# Patient Record
Sex: MALE | Race: WHITE
[De-identification: names, ages, dates, MRNs, and addresses within clinical notes are randomized per-mention and may not be internally consistent; named-entity substitution may affect disease eponyms.]

---

## 2009-09-04 VITALS — SYSTOLIC BLOOD PRESSURE: 8 MMHG

## 2019-08-30 ENCOUNTER — HOSPITAL ENCOUNTER (INPATIENT)
Dept: HOSPITAL 19 - COL.ER | Age: 84
LOS: 1 days | Discharge: HOME | DRG: 65 | End: 2019-08-31
Attending: INTERNAL MEDICINE | Admitting: INTERNAL MEDICINE
Payer: MEDICARE

## 2019-08-30 VITALS — HEART RATE: 65 BPM | TEMPERATURE: 98.1 F | SYSTOLIC BLOOD PRESSURE: 133 MMHG | DIASTOLIC BLOOD PRESSURE: 82 MMHG

## 2019-08-30 VITALS — HEART RATE: 65 BPM | DIASTOLIC BLOOD PRESSURE: 80 MMHG | SYSTOLIC BLOOD PRESSURE: 145 MMHG

## 2019-08-30 VITALS — SYSTOLIC BLOOD PRESSURE: 141 MMHG | DIASTOLIC BLOOD PRESSURE: 86 MMHG | HEART RATE: 63 BPM | TEMPERATURE: 97.8 F

## 2019-08-30 VITALS — WEIGHT: 191.8 LBS | HEIGHT: 67.99 IN | BODY MASS INDEX: 29.07 KG/M2

## 2019-08-30 DIAGNOSIS — I63.9: Primary | ICD-10-CM

## 2019-08-30 DIAGNOSIS — J98.4: ICD-10-CM

## 2019-08-30 DIAGNOSIS — E78.00: ICD-10-CM

## 2019-08-30 DIAGNOSIS — I10: ICD-10-CM

## 2019-08-30 DIAGNOSIS — I65.1: ICD-10-CM

## 2019-08-30 DIAGNOSIS — D50.9: ICD-10-CM

## 2019-08-30 DIAGNOSIS — Z92.3: ICD-10-CM

## 2019-08-30 DIAGNOSIS — M85.80: ICD-10-CM

## 2019-08-30 DIAGNOSIS — E03.9: ICD-10-CM

## 2019-08-30 DIAGNOSIS — Z85.46: ICD-10-CM

## 2019-08-30 DIAGNOSIS — Z88.8: ICD-10-CM

## 2019-08-30 DIAGNOSIS — Z88.0: ICD-10-CM

## 2019-08-30 DIAGNOSIS — I65.22: ICD-10-CM

## 2019-08-30 DIAGNOSIS — E87.1: ICD-10-CM

## 2019-08-30 DIAGNOSIS — Z87.891: ICD-10-CM

## 2019-08-30 LAB
ALBUMIN SERPL-MCNC: 2.8 GM/DL (ref 3.5–5)
ALP SERPL-CCNC: 43 U/L (ref 50–136)
ALT SERPL-CCNC: 20 U/L (ref 21–72)
ANION GAP SERPL CALC-SCNC: 6 MMOL/L (ref 7–16)
APTT PPP: 33.6 SECONDS (ref 26–37)
AST SERPL-CCNC: 48 U/L (ref 15–37)
BASOPHILS # BLD: 0 10*3/UL (ref 0–0.2)
BASOPHILS NFR BLD AUTO: 0.2 % (ref 0–2)
BILIRUB SERPL-MCNC: 0.8 MG/DL (ref 0–1)
BUN SERPL-MCNC: 11 MG/DL (ref 9–20)
CALCIUM SERPL-MCNC: 7.2 MG/DL (ref 8.4–10.2)
CHLORIDE SERPL-SCNC: 105 MMOL/L (ref 98–107)
CO2 SERPL-SCNC: 24 MMOL/L (ref 22–30)
CREAT SERPL-SCNC: 0.55 UMOL/L (ref 0.66–1.25)
EOSINOPHIL # BLD: 0.1 10*3/UL (ref 0–0.7)
EOSINOPHIL NFR BLD: 1.4 % (ref 0–4)
ERYTHROCYTE [DISTWIDTH] IN BLOOD BY AUTOMATED COUNT: 12.4 % (ref 11.5–14.5)
GLUCOSE SERPL-MCNC: 77 MG/DL (ref 74–106)
GRANULOCYTES # BLD AUTO: 64.5 % (ref 42.2–75.2)
HCT VFR BLD AUTO: 30.6 % (ref 42–52)
HGB BLD-MCNC: 10.4 G/DL (ref 13.5–18)
INR BLD: 1.1 (ref 0.8–3)
LYMPHOCYTES # BLD: 1.1 10*3/UL (ref 1.2–3.4)
LYMPHOCYTES NFR BLD: 22 % (ref 20–51)
MCH RBC QN AUTO: 35 PG (ref 27–31)
MCHC RBC AUTO-ENTMCNC: 34 G/DL (ref 33–37)
MCV RBC AUTO: 103 FL (ref 80–100)
MONOCYTES # BLD: 0.6 10*3/UL (ref 0.1–0.6)
MONOCYTES NFR BLD AUTO: 11.5 % (ref 1.7–9.3)
NEUTROPHILS # BLD: 3.1 10*3/UL (ref 1.4–6.5)
PLATELET # BLD AUTO: 169 K/MM3 (ref 130–400)
PMV BLD AUTO: 9.4 FL (ref 7.4–10.4)
POTASSIUM SERPL-SCNC: 3.4 MMOL/L (ref 3.4–5)
PROT SERPL-MCNC: 5.4 GM/DL (ref 6.4–8.2)
PROTHROMBIN TIME: 13 SECONDS (ref 9.7–12.8)
RBC # BLD AUTO: 2.97 M/MM3 (ref 4.2–5.6)
SODIUM SERPL-SCNC: 135 MMOL/L (ref 137–145)
TROPONIN I SERPL-MCNC: < 0.012 NG/ML (ref 0–0.04)

## 2019-08-30 PROCEDURE — A9585 GADOBUTROL INJECTION: HCPCS

## 2019-08-30 NOTE — NUR
PT A/O X4, AMBULATING IN ROOM. PT HAD BUMPED IV ON LEFT HAND AND LEFT HAND WAS
BLEEDING. APPLIED PRESSURE TO SITE THEN HAD TAKEN IV OUT APPLIED PRESSURE TILL
BLEEDING STOPPED AND A PROTECTIVE DRSG OVER SITE. PLACED A NEW 20G IV IN LEFT
AC, X2 ATTEMPTS, WITH NO DIFFICULTIES, AND PT TOLERATED WELL. PT DID NOT
APPEAR TO HAVE ANY RESIDUALS WHEN AMBULATING, BUT WHEN PT TRIES TO HOLD RIGHT
ARM UP HE HAS TO USE THE LEFT HAND TO BRING RIGHT ARM UP. PT'S  ARE EQUAL
BUT STRENGTH IN RIGHT ARM IS MUCH WEAKER WHEN APPLYING PRESSURE UP OR DOWN
WHEN ARMS ARE HELD UP. PT DENIES PAIN OR DISCOMFORT AND IS AWARE THAT HE WILL
BE AWAKEN FOR NEURO CHECKS AND STROKE SCALE. PT'S FAMILY WAS IN ROOM WITH PT
VISITING BUT HAS SINCE LEFT AND NOW PT IN BED WITH HOB AT 30 DEGREE ANGLE AND
REST/SLEEPING. CALL LIGHT WITHIN REACH.

## 2019-08-30 NOTE — NUR
Resting on cart. Very talkative. C/o right arm and right leg " just not moving
the way they normally do". Lungs CTA. Bowel sounds x4. 20 gauge INT had been
placed to left hand. Pupils 2 cm and fixed. +1 edema noted to right leg.
Follows directions and answers questions apporpriately

## 2019-08-30 NOTE — NUR
Pt arrives to medical unit rm 306 from ED via WC accompanied by CNA's and pt's
family. Pt awake and alert, oriented x 4. Slight drift to right upper ext,
hand  equal bilat. Strength to lower ext equal bilat with slight limp on
right side when ambulating, otherwise steady gait. Pt denies pain at this
time. Saline lock IV without s/s of complications. No further needs reported.
Call light in reach.

## 2019-08-30 NOTE — NUR
CALLED DR. PORTER IN REFERENCE TO LIPITOR AND NEURO CHECKS. DR. PORTER ADVISED
THAT LIPITOR IS GIVEN TO EVERYONE WHO HAS HAD A STROKE AND CAN CHANGE NEURO
CHECKS FROM EVERY 2 HOURS TO EVERY 4 HOURS.

## 2019-08-31 VITALS — DIASTOLIC BLOOD PRESSURE: 82 MMHG | SYSTOLIC BLOOD PRESSURE: 132 MMHG | TEMPERATURE: 97.9 F | HEART RATE: 68 BPM

## 2019-08-31 VITALS — HEART RATE: 71 BPM | DIASTOLIC BLOOD PRESSURE: 92 MMHG | SYSTOLIC BLOOD PRESSURE: 137 MMHG | TEMPERATURE: 97.7 F

## 2019-08-31 VITALS — HEART RATE: 60 BPM | SYSTOLIC BLOOD PRESSURE: 136 MMHG | DIASTOLIC BLOOD PRESSURE: 78 MMHG | TEMPERATURE: 97.9 F

## 2019-08-31 LAB
ALBUMIN SERPL-MCNC: 3.7 GM/DL (ref 3.5–5)
ALP SERPL-CCNC: 57 U/L (ref 50–136)
ALT SERPL-CCNC: 27 U/L (ref 21–72)
ANION GAP SERPL CALC-SCNC: 8 MMOL/L (ref 7–16)
AST SERPL-CCNC: 38 U/L (ref 15–37)
BASOPHILS # BLD: 0 10*3/UL (ref 0–0.2)
BASOPHILS NFR BLD AUTO: 0.3 % (ref 0–2)
BILIRUB SERPL-MCNC: 1 MG/DL (ref 0–1)
BUN SERPL-MCNC: 13 MG/DL (ref 9–20)
CALCIUM SERPL-MCNC: 9.2 MG/DL (ref 8.4–10.2)
CHLORIDE SERPL-SCNC: 99 MMOL/L (ref 98–107)
CHOLEST SPEC-SCNC: 187 MG/DL (ref 120–200)
CHOLEST/HDLC SERPL-SRTO: 5.8
CO2 SERPL-SCNC: 25 MMOL/L (ref 22–30)
CREAT SERPL-SCNC: 0.64 UMOL/L (ref 0.66–1.25)
EOSINOPHIL # BLD: 0.1 10*3/UL (ref 0–0.7)
EOSINOPHIL NFR BLD: 2.8 % (ref 0–4)
ERYTHROCYTE [DISTWIDTH] IN BLOOD BY AUTOMATED COUNT: 12.5 % (ref 11.5–14.5)
FOLATE (FOLIC ACID): 17.2 NG/ML (ref 7–31.4)
GLUCOSE SERPL-MCNC: 83 MG/DL (ref 74–106)
GRANULOCYTES # BLD AUTO: 50.3 % (ref 42.2–75.2)
HCT VFR BLD AUTO: 34.2 % (ref 42–52)
HDLC SERPL-MCNC: 32 MG/DL
HGB BLD-MCNC: 11.7 G/DL (ref 13.5–18)
LDLC SERPL-MCNC: 137 MG/DL
LYMPHOCYTES # BLD: 1.1 10*3/UL (ref 1.2–3.4)
LYMPHOCYTES NFR BLD: 31 % (ref 20–51)
MCH RBC QN AUTO: 35 PG (ref 27–31)
MCHC RBC AUTO-ENTMCNC: 34 G/DL (ref 33–37)
MCV RBC AUTO: 103 FL (ref 80–100)
MONOCYTES # BLD: 0.6 10*3/UL (ref 0.1–0.6)
MONOCYTES NFR BLD AUTO: 15.6 % (ref 1.7–9.3)
NEUTROPHILS # BLD: 1.8 10*3/UL (ref 1.4–6.5)
PLATELET # BLD AUTO: 210 K/MM3 (ref 130–400)
PMV BLD AUTO: 9.7 FL (ref 7.4–10.4)
POTASSIUM SERPL-SCNC: 3.7 MMOL/L (ref 3.4–5)
PROT SERPL-MCNC: 6.7 GM/DL (ref 6.4–8.2)
RBC # BLD AUTO: 3.33 M/MM3 (ref 4.2–5.6)
SODIUM SERPL-SCNC: 133 MMOL/L (ref 137–145)
TRIGL SERPL-MCNC: 89 MG/DL

## 2019-08-31 NOTE — NUR
PT SLEEPING/RESTING WITH HOB AT 15 DEGREE ANGLE. PT AWAKENS EASILY FOR NEURO
AND STROKE SCALE ASSESSMENTS. PT'S 0400 NEURO'S AND STROKE SCALE ASSESSMENT,
SEEMED TO HAVE SOME IMPROVEMENT WITH HIS RIGHT ARM. PT WAS ABLE TO LIFT IT
WITHOUT THE LEFT HAND HELPING, AND DRIFT WAS MINIMAL. STRENGHT WAS STILL WEAK
WHEN APPLYING RESISTANCE. PT A/O X4, DENIES PAIN OR DISCOMFORT AND HAS NO
NEEDS AT THIS TIME.  CALL LIGHT WITHIN REACH.

## 2019-08-31 NOTE — NUR
Pt discharge instructions discussed and reviewed with patient and family. All
questions answered and verbalizes understanding. LAC INT IV dc'd with catheter
tip intact and no complications. Pt escorted out via WC by this nurse.

## 2019-08-31 NOTE — NUR
Pt assessment completed and charted. Pt appears to be doing well. Per pt he
wasn't able to lift right arm without assistance prior. pt ambulating
independently in halls with no issues, steady gait. Neuro checks are good and
pt not scoring on stroke scale. Pt denies dizziness, SOB, N/V, chest pain. LAC
INT IV flushes with no complications. Pt denies other needs at this time. Call
light within reach.

## 2019-10-15 ENCOUNTER — HOSPITAL ENCOUNTER (OUTPATIENT)
Dept: HOSPITAL 19 - WSPT | Age: 84
LOS: 62 days | Discharge: HOME | End: 2019-12-16
Attending: FAMILY MEDICINE
Payer: MEDICARE

## 2019-10-15 DIAGNOSIS — I63.9: Primary | ICD-10-CM

## 2020-01-15 ENCOUNTER — HOSPITAL ENCOUNTER (OUTPATIENT)
Dept: HOSPITAL 19 - EUO | Age: 85
Discharge: HOME | End: 2020-01-15
Attending: FAMILY MEDICINE
Payer: MEDICARE

## 2020-01-15 VITALS — HEART RATE: 71 BPM | TEMPERATURE: 97.2 F | DIASTOLIC BLOOD PRESSURE: 83 MMHG | SYSTOLIC BLOOD PRESSURE: 130 MMHG

## 2020-01-15 VITALS — WEIGHT: 187.39 LBS | HEIGHT: 67.99 IN | BODY MASS INDEX: 28.4 KG/M2

## 2020-01-15 DIAGNOSIS — M81.6: Primary | ICD-10-CM

## 2021-07-22 ENCOUNTER — HOSPITAL ENCOUNTER (OUTPATIENT)
Dept: HOSPITAL 19 - SDCO | Age: 86
End: 2021-07-22
Attending: SURGERY
Payer: MEDICARE

## 2021-07-22 VITALS — HEART RATE: 70 BPM | SYSTOLIC BLOOD PRESSURE: 122 MMHG | DIASTOLIC BLOOD PRESSURE: 78 MMHG

## 2021-07-22 VITALS — BODY MASS INDEX: 27.13 KG/M2 | WEIGHT: 179.02 LBS | HEIGHT: 67.99 IN

## 2021-07-22 VITALS — DIASTOLIC BLOOD PRESSURE: 84 MMHG | HEART RATE: 66 BPM | SYSTOLIC BLOOD PRESSURE: 138 MMHG

## 2021-07-22 VITALS — DIASTOLIC BLOOD PRESSURE: 63 MMHG | TEMPERATURE: 98.2 F | SYSTOLIC BLOOD PRESSURE: 80 MMHG | HEART RATE: 86 BPM

## 2021-07-22 VITALS — TEMPERATURE: 97 F | DIASTOLIC BLOOD PRESSURE: 78 MMHG | HEART RATE: 68 BPM | SYSTOLIC BLOOD PRESSURE: 113 MMHG

## 2021-07-22 DIAGNOSIS — Z79.890: ICD-10-CM

## 2021-07-22 DIAGNOSIS — Z85.46: ICD-10-CM

## 2021-07-22 DIAGNOSIS — K57.30: ICD-10-CM

## 2021-07-22 DIAGNOSIS — M81.0: ICD-10-CM

## 2021-07-22 DIAGNOSIS — D12.4: Primary | ICD-10-CM

## 2021-07-22 DIAGNOSIS — Z86.73: ICD-10-CM

## 2021-07-22 DIAGNOSIS — M19.90: ICD-10-CM

## 2021-07-22 DIAGNOSIS — I10: ICD-10-CM

## 2021-07-22 DIAGNOSIS — D64.9: ICD-10-CM

## 2021-07-22 DIAGNOSIS — E03.9: ICD-10-CM

## 2021-07-22 DIAGNOSIS — Z79.899: ICD-10-CM

## 2021-07-22 DIAGNOSIS — Z79.02: ICD-10-CM

## 2021-07-22 DIAGNOSIS — E78.5: ICD-10-CM

## 2021-07-22 DIAGNOSIS — E78.00: ICD-10-CM

## 2021-07-22 DIAGNOSIS — R53.82: ICD-10-CM

## 2021-07-22 DIAGNOSIS — Z80.9: ICD-10-CM

## 2021-07-22 LAB
HCT VFR BLD AUTO: 31.8 % (ref 42–52)
HGB BLD-MCNC: 10.6 G/DL (ref 13.5–18)

## 2021-07-22 NOTE — NUR
1010 - Patient waking up at this time.
1015 - Patient given muffin and soda at this time.
1025 - Patient tolerating muffin and soda well and without any nausea. Vitals
stable.
1030 - Dismissal instructions gone over with patient. Paitent and family
voice understanding and all questions answered.
1045 - Patient discharged to private vehicle his daughter is driving at
patient enterance via wheelchair without any complications. Patient leaves
thanking staff for services.

## 2021-07-22 NOTE — NUR
Patient arrives back to [facility] at this time. Patient remains on cart due to
drowsiness. Patient monitor applied, vitals stable. Patient's daughter and
spouse at bedside. Dr Chew is visiting with patient's family to go over
procedure results at this time.

## 2021-07-29 ENCOUNTER — HOSPITAL ENCOUNTER (INPATIENT)
Dept: HOSPITAL 19 - COL.ER | Age: 86
LOS: 2 days | Discharge: ANOTHER HOSPITAL | DRG: 682 | End: 2021-07-31
Attending: INTERNAL MEDICINE | Admitting: INTERNAL MEDICINE
Payer: MEDICARE

## 2021-07-29 VITALS — BODY MASS INDEX: 26.06 KG/M2 | WEIGHT: 171.96 LBS | HEIGHT: 67.99 IN

## 2021-07-29 DIAGNOSIS — D64.9: ICD-10-CM

## 2021-07-29 DIAGNOSIS — I48.92: ICD-10-CM

## 2021-07-29 DIAGNOSIS — C90.00: ICD-10-CM

## 2021-07-29 DIAGNOSIS — Z85.46: ICD-10-CM

## 2021-07-29 DIAGNOSIS — Z86.73: ICD-10-CM

## 2021-07-29 DIAGNOSIS — N17.9: Primary | ICD-10-CM

## 2021-07-29 DIAGNOSIS — E03.9: ICD-10-CM

## 2021-07-29 DIAGNOSIS — E78.5: ICD-10-CM

## 2021-07-29 DIAGNOSIS — E87.6: ICD-10-CM

## 2021-07-29 DIAGNOSIS — Z20.822: ICD-10-CM

## 2021-07-29 DIAGNOSIS — Z66: ICD-10-CM

## 2021-07-29 DIAGNOSIS — G93.41: ICD-10-CM

## 2021-07-29 DIAGNOSIS — E83.52: ICD-10-CM

## 2021-07-29 LAB
ALBUMIN SERPL-MCNC: 3.5 GM/DL (ref 3.5–5)
ALP SERPL-CCNC: 84 U/L (ref 50–136)
ALT SERPL-CCNC: 48 U/L (ref 4–49)
ANION GAP SERPL CALC-SCNC: 4 MMOL/L (ref 7–16)
APTT PPP: 29.9 SECONDS (ref 26–37)
AST SERPL-CCNC: 89 U/L (ref 15–37)
BASOPHILS # BLD: 0 10*3/UL (ref 0–0.2)
BASOPHILS NFR BLD AUTO: 0.3 % (ref 0–2)
BILIRUB SERPL-MCNC: 1 MG/DL (ref 0–1)
BUN SERPL-MCNC: 31 MG/DL (ref 9–20)
CALCIUM SERPL-MCNC: 16.2 MG/DL (ref 8.4–10.2)
CHLORIDE SERPL-SCNC: 101 MMOL/L (ref 98–107)
CO2 SERPL-SCNC: 27 MMOL/L (ref 22–30)
CREAT SERPL-SCNC: 2.17 UMOL/L (ref 0.66–1.25)
EOSINOPHIL # BLD: 0.1 10*3/UL (ref 0–0.7)
EOSINOPHIL NFR BLD: 1.4 % (ref 0–4)
ERYTHROCYTE [DISTWIDTH] IN BLOOD BY AUTOMATED COUNT: 12.8 % (ref 11.5–14.5)
GLUCOSE SERPL-MCNC: 102 MG/DL (ref 74–106)
GRANULOCYTES # BLD AUTO: 59.1 % (ref 42.2–75.2)
HCT VFR BLD AUTO: 32.6 % (ref 42–52)
HGB BLD-MCNC: 11.3 G/DL (ref 13.5–18)
INR BLD: 1.1 (ref 0.8–3)
LYMPHOCYTES # BLD: 2.1 10*3/UL (ref 1.2–3.4)
LYMPHOCYTES NFR BLD: 25.9 % (ref 20–51)
MCH RBC QN AUTO: 35 PG (ref 27–31)
MCHC RBC AUTO-ENTMCNC: 35 G/DL (ref 33–37)
MCV RBC AUTO: 100 FL (ref 80–100)
MONOCYTES # BLD: 1 10*3/UL (ref 0.1–0.6)
MONOCYTES NFR BLD AUTO: 12.9 % (ref 1.7–9.3)
NEUTROPHILS # BLD: 4.7 10*3/UL (ref 1.4–6.5)
PH UR STRIP.AUTO: 6 [PH] (ref 5–8)
PLATELET # BLD AUTO: 226 K/MM3 (ref 130–400)
PMV BLD AUTO: 10 FL (ref 7.4–10.4)
POTASSIUM SERPL-SCNC: 3.5 MMOL/L (ref 3.4–5)
PROT SERPL-MCNC: 6.7 GM/DL (ref 6.4–8.2)
PROTHROMBIN TIME: 12.2 SECONDS (ref 9.7–12.8)
RBC # BLD AUTO: 3.26 M/MM3 (ref 4.2–5.6)
RBC # UR: (no result) /HPF
SODIUM SERPL-SCNC: 133 MMOL/L (ref 137–145)
SP GR UR STRIP.AUTO: 1.01 (ref 1–1.03)
SQUAMOUS # URNS: (no result) /HPF
TROPONIN I SERPL-MCNC: < 0.012 NG/ML (ref 0–0.04)
URN COLLECT METHOD CLASS: (no result)
WBC # UR: (no result) /HPF

## 2021-07-30 VITALS — HEART RATE: 83 BPM | SYSTOLIC BLOOD PRESSURE: 151 MMHG | DIASTOLIC BLOOD PRESSURE: 86 MMHG | TEMPERATURE: 97.7 F

## 2021-07-30 VITALS — TEMPERATURE: 97.7 F | SYSTOLIC BLOOD PRESSURE: 143 MMHG | DIASTOLIC BLOOD PRESSURE: 98 MMHG | HEART RATE: 103 BPM

## 2021-07-30 VITALS — SYSTOLIC BLOOD PRESSURE: 149 MMHG | DIASTOLIC BLOOD PRESSURE: 93 MMHG | TEMPERATURE: 97.6 F | HEART RATE: 95 BPM

## 2021-07-30 VITALS — SYSTOLIC BLOOD PRESSURE: 138 MMHG | TEMPERATURE: 97.8 F | DIASTOLIC BLOOD PRESSURE: 88 MMHG | HEART RATE: 86 BPM

## 2021-07-30 VITALS — HEART RATE: 84 BPM | TEMPERATURE: 98.1 F | DIASTOLIC BLOOD PRESSURE: 84 MMHG | SYSTOLIC BLOOD PRESSURE: 134 MMHG

## 2021-07-30 LAB
ALBUMIN SERPL-MCNC: 3.6 GM/DL (ref 3.5–5)
ALP SERPL-CCNC: 77 U/L (ref 50–136)
ALT SERPL-CCNC: 59 U/L (ref 4–49)
ANION GAP SERPL CALC-SCNC: 6 MMOL/L (ref 7–16)
AST SERPL-CCNC: 120 U/L (ref 15–37)
BASOPHILS # BLD: 0 10*3/UL (ref 0–0.2)
BASOPHILS NFR BLD AUTO: 0.2 % (ref 0–2)
BILIRUB SERPL-MCNC: 0.8 MG/DL (ref 0–1)
BUN SERPL-MCNC: 36 MG/DL (ref 9–20)
CALCIUM SERPL-MCNC: 14.4 MG/DL (ref 8.4–10.2)
CHLORIDE SERPL-SCNC: 102 MMOL/L (ref 98–107)
CO2 SERPL-SCNC: 28 MMOL/L (ref 22–30)
CREAT SERPL-SCNC: 2.17 UMOL/L (ref 0.66–1.25)
EOSINOPHIL # BLD: 0 10*3/UL (ref 0–0.7)
EOSINOPHIL NFR BLD: 0 % (ref 0–4)
ERYTHROCYTE [DISTWIDTH] IN BLOOD BY AUTOMATED COUNT: 12.8 % (ref 11.5–14.5)
GLUCOSE SERPL-MCNC: 125 MG/DL (ref 74–106)
GRANULOCYTES # BLD AUTO: 78.7 % (ref 42.2–75.2)
HCT VFR BLD AUTO: 32.6 % (ref 42–52)
HGB BLD-MCNC: 10.9 G/DL (ref 13.5–18)
LDH SERPL-CCNC: 554 U/L (ref 313–618)
LYMPHOCYTES # BLD: 1.9 10*3/UL (ref 1.2–3.4)
LYMPHOCYTES NFR BLD: 14.2 % (ref 20–51)
MCH RBC QN AUTO: 34 PG (ref 27–31)
MCHC RBC AUTO-ENTMCNC: 33 G/DL (ref 33–37)
MCV RBC AUTO: 102 FL (ref 80–100)
MONOCYTES # BLD: 0.8 10*3/UL (ref 0.1–0.6)
MONOCYTES NFR BLD AUTO: 6.4 % (ref 1.7–9.3)
NEUTROPHILS # BLD: 10.3 10*3/UL (ref 1.4–6.5)
PLATELET # BLD AUTO: 239 K/MM3 (ref 130–400)
PMV BLD AUTO: 10.1 FL (ref 7.4–10.4)
POTASSIUM SERPL-SCNC: 3 MMOL/L (ref 3.4–5)
PROT SERPL-MCNC: 6.6 GM/DL (ref 6.4–8.2)
RBC # BLD AUTO: 3.2 M/MM3 (ref 4.2–5.6)
SODIUM SERPL-SCNC: 136 MMOL/L (ref 137–145)
URATE SERPL-MCNC: 8.6 MG/DL (ref 3.5–8.5)

## 2021-07-30 NOTE — NUR
SLEEPING UPON ASSESSMENT. WAKES AT TIMES AND ATTEMPTS TO CLIMB OUT OF BED. NOT
ALERT ENOUGH FOR COLACE ORDERED AND HELD. NS GOING AT 100ML. K PROTOCOL
ORDERED BY DR ROTHMAN WILL PLACE ORDER. CASEY CUMMINGS TO [place].

## 2021-07-30 NOTE — NUR
ALEENA met with the patient's wife, Lydia (c.ph#[phone],
h.ph#[phone]), to discuss discharge plan. The patient lives in [place]
with his wife. Lydia reports that the patient was very active and
independent with ADLs up until a few days ago. She reports that recently he
has been needing assistance with his ADLs and has a cane and walker. The
patient's PCP is Dr. Pantera Gtz and he receives his medications from [facility]. The patient does not have a DPOA-HC, but his wife was interested in
obtaining a form. ALEENA provided. PT/OT is recommending SNF. The patient had
increased falls and confusion at home before coming home. ALEENA discussed SNF
with Lydia. Lydia reports that she would like to be able to take the
patient home, if able, and would be open to home health. ALEENA informed her how
the patient is a fall risk. Lydia would like to see how the patient does. SW
to continue to follow.
 
*Discharge plan: Wife would like to see how the patient does, but would like
for him to return home with her*

## 2021-07-30 NOTE — NUR
ARRIVED TO UNIT ATTEMPTED TO CLIMB OUT OF BED. REORIENTATED, BED ALARM ON. PT
THEN WAS FOUND TO HAVE PULLED OUT HIS PEÑA CATH. REPLACED W C/O IT HURTING.
ASSESSMENT DONE. CALL LIGHT WI REACH. EYES VIEW FROM NURSE STATION AS PT HAS
HAD MULTIPLE FALLS.

## 2021-07-30 NOTE — NUR
TV TURNED ON FOR DISTACTION. IV FLUIDS RUNNING AND COBAINED AS PT IS ALL OVER
IN THE BED. STAT LOCK REPLACED TO PEÑA.

## 2021-07-31 VITALS — TEMPERATURE: 97.9 F | DIASTOLIC BLOOD PRESSURE: 59 MMHG | HEART RATE: 76 BPM | SYSTOLIC BLOOD PRESSURE: 116 MMHG

## 2021-07-31 VITALS — TEMPERATURE: 97.6 F | HEART RATE: 91 BPM | SYSTOLIC BLOOD PRESSURE: 137 MMHG | DIASTOLIC BLOOD PRESSURE: 89 MMHG

## 2021-07-31 VITALS — OXYGEN SATURATION: 96 %

## 2021-07-31 VITALS — HEART RATE: 107 BPM | OXYGEN SATURATION: 97 % | SYSTOLIC BLOOD PRESSURE: 82 MMHG | DIASTOLIC BLOOD PRESSURE: 63 MMHG

## 2021-07-31 VITALS — OXYGEN SATURATION: 99 % | HEART RATE: 100 BPM | SYSTOLIC BLOOD PRESSURE: 100 MMHG | DIASTOLIC BLOOD PRESSURE: 71 MMHG

## 2021-07-31 VITALS — OXYGEN SATURATION: 100 %

## 2021-07-31 VITALS — OXYGEN SATURATION: 97 %

## 2021-07-31 VITALS — TEMPERATURE: 97.9 F | DIASTOLIC BLOOD PRESSURE: 81 MMHG | HEART RATE: 83 BPM | SYSTOLIC BLOOD PRESSURE: 130 MMHG

## 2021-07-31 VITALS — OXYGEN SATURATION: 99 %

## 2021-07-31 VITALS — DIASTOLIC BLOOD PRESSURE: 88 MMHG | HEART RATE: 85 BPM | SYSTOLIC BLOOD PRESSURE: 150 MMHG

## 2021-07-31 VITALS — OXYGEN SATURATION: 98 %

## 2021-07-31 VITALS — SYSTOLIC BLOOD PRESSURE: 82 MMHG | OXYGEN SATURATION: 97 % | DIASTOLIC BLOOD PRESSURE: 63 MMHG

## 2021-07-31 VITALS — DIASTOLIC BLOOD PRESSURE: 98 MMHG | SYSTOLIC BLOOD PRESSURE: 147 MMHG | TEMPERATURE: 97.8 F | HEART RATE: 84 BPM

## 2021-07-31 VITALS — OXYGEN SATURATION: 92 %

## 2021-07-31 VITALS — OXYGEN SATURATION: 95 %

## 2021-07-31 LAB
ALBUMIN SERPL-MCNC: 3.5 GM/DL (ref 3.5–5)
ALBUMIN SERPL-MCNC: 3.7 GM/DL (ref 3.5–5)
ALP SERPL-CCNC: 86 U/L (ref 50–136)
ALT SERPL-CCNC: 90 U/L (ref 4–49)
ANION GAP SERPL CALC-SCNC: 4 MMOL/L (ref 7–16)
ANION GAP SERPL CALC-SCNC: 5 MMOL/L (ref 7–16)
AST SERPL-CCNC: 186 U/L (ref 15–37)
BASE EXCESS BLDA CALC-SCNC: -0.5 MMOL/L (ref -2–2)
BASOPHILS # BLD: 0 10*3/UL (ref 0–0.2)
BASOPHILS # BLD: 0 10*3/UL (ref 0–0.2)
BASOPHILS NFR BLD AUTO: 0.1 % (ref 0–2)
BASOPHILS NFR BLD AUTO: 0.1 % (ref 0–2)
BILIRUB SERPL-MCNC: 1.1 MG/DL (ref 0–1)
BUN SERPL-MCNC: 41 MG/DL (ref 9–20)
BUN SERPL-MCNC: 41 MG/DL (ref 9–20)
CALCIUM SERPL-MCNC: 13 MG/DL (ref 8.4–10.2)
CALCIUM SERPL-MCNC: 13 MG/DL (ref 8.4–10.2)
CHLORIDE SERPL-SCNC: 105 MMOL/L (ref 98–107)
CHLORIDE SERPL-SCNC: 107 MMOL/L (ref 98–107)
CO2 BLDA-SCNC: 23.6 MMOL/L
CO2 SERPL-SCNC: 27 MMOL/L (ref 22–30)
CO2 SERPL-SCNC: 29 MMOL/L (ref 22–30)
CREAT SERPL-SCNC: 1.83 UMOL/L (ref 0.66–1.25)
CREAT SERPL-SCNC: 2.02 UMOL/L (ref 0.66–1.25)
EOSINOPHIL # BLD: 0 10*3/UL (ref 0–0.7)
EOSINOPHIL # BLD: 0.1 10*3/UL (ref 0–0.7)
EOSINOPHIL NFR BLD: 0.1 % (ref 0–4)
EOSINOPHIL NFR BLD: 0.4 % (ref 0–4)
ERYTHROCYTE [DISTWIDTH] IN BLOOD BY AUTOMATED COUNT: 12.9 % (ref 11.5–14.5)
ERYTHROCYTE [DISTWIDTH] IN BLOOD BY AUTOMATED COUNT: 12.9 % (ref 11.5–14.5)
GLUCOSE SERPL-MCNC: 105 MG/DL (ref 74–106)
GLUCOSE SERPL-MCNC: 88 MG/DL (ref 74–106)
GRANULOCYTES # BLD AUTO: 65.5 % (ref 42.2–75.2)
GRANULOCYTES # BLD AUTO: 75.2 % (ref 42.2–75.2)
HCO3 BLDA-SCNC: 22.6 MEQ/L (ref 22–26)
HCT VFR BLD AUTO: 34.7 % (ref 42–52)
HCT VFR BLD AUTO: 36.1 % (ref 42–52)
HGB BLD-MCNC: 11.7 G/DL (ref 13.5–18)
HGB BLD-MCNC: 11.8 G/DL (ref 13.5–18)
INHALED O2 CONCENTRATION: 100 %
LYMPHOCYTES # BLD: 2 10*3/UL (ref 1.2–3.4)
LYMPHOCYTES # BLD: 3 10*3/UL (ref 1.2–3.4)
LYMPHOCYTES NFR BLD: 14.9 % (ref 20–51)
LYMPHOCYTES NFR BLD: 24.1 % (ref 20–51)
MAGNESIUM SERPL-MCNC: 2 MG/DL (ref 1.6–2.3)
MCH RBC QN AUTO: 34 PG (ref 27–31)
MCH RBC QN AUTO: 34 PG (ref 27–31)
MCHC RBC AUTO-ENTMCNC: 33 G/DL (ref 33–37)
MCHC RBC AUTO-ENTMCNC: 34 G/DL (ref 33–37)
MCV RBC AUTO: 102 FL (ref 80–100)
MCV RBC AUTO: 103 FL (ref 80–100)
MONOCYTES # BLD: 1.2 10*3/UL (ref 0.1–0.6)
MONOCYTES # BLD: 1.2 10*3/UL (ref 0.1–0.6)
MONOCYTES NFR BLD AUTO: 9.2 % (ref 1.7–9.3)
MONOCYTES NFR BLD AUTO: 9.7 % (ref 1.7–9.3)
NEUTROPHILS # BLD: 10.2 10*3/UL (ref 1.4–6.5)
NEUTROPHILS # BLD: 8 10*3/UL (ref 1.4–6.5)
PCO2 BLDA: 32.2 MMHG (ref 35–45)
PHOSPHATE SERPL-MCNC: 2.2 MG/DL (ref 2.5–4.5)
PHOSPHATE SERPL-MCNC: 2.7 MG/DL (ref 2.5–4.5)
PLATELET # BLD AUTO: 255 K/MM3 (ref 130–400)
PLATELET # BLD AUTO: 260 K/MM3 (ref 130–400)
PMV BLD AUTO: 10 FL (ref 7.4–10.4)
PMV BLD AUTO: 10.3 FL (ref 7.4–10.4)
PO2 BLDA: 232.8 MMHG (ref 80–100)
POTASSIUM SERPL-SCNC: 2.6 MMOL/L (ref 3.4–5)
POTASSIUM SERPL-SCNC: 3.3 MMOL/L (ref 3.4–5)
PROT SERPL-MCNC: 6.8 GM/DL (ref 6.4–8.2)
RBC # BLD AUTO: 3.4 M/MM3 (ref 4.2–5.6)
RBC # BLD AUTO: 3.49 M/MM3 (ref 4.2–5.6)
SAO2 % BLDA: 99.2 % (ref 92–100)
SODIUM SERPL-SCNC: 138 MMOL/L (ref 137–145)
SODIUM SERPL-SCNC: 139 MMOL/L (ref 137–145)
TROPONIN I SERPL-MCNC: 0.02 NG/ML (ref 0–0.04)

## 2021-07-31 NOTE — NUR
Attempting to give patient his medications with breakfast. Successful with two
medications the others were unsuccessful. The patient is having 20 second
periods of apnea each minute, and during the other 40 seconds is breathing at
32 respirations. He continues to be very confused, and incomprehensible words.
He is accompanied by his wife at bedside. Dr. Orta has been notified of the
low potassium as well as the apnea episodes. The patient grimaces when
assessing the abdomen. The patient seems to be constipated as he is smearing
on the venancio pad, but is unable to have a BM. Colace was administered this am.
No other concerns at this time. will continue to assess frequently.

## 2021-07-31 NOTE — NUR
At approximately 1840 CHINMAY Reno came to get this RN to advise that the patient
was attempting to get out of bed, and that telemetry called to say
that the patient was in SVT. This RN went immediately to the patient's room
where CHINMAY Moody, and CHINMAY Jackson were at bedside getting a set of vital signs
as well as getting O2 saturations.
 
1850: Dr. Orta notified of pt's status. Said to Consult Hospitalist and
Cardiology.
 
1856; MATHEW Beal was consulted. Pt status is rapidly declining. Attempting
to get blood pressures, pt is moving too much. Pt's heartrate is 140's.
 
Dr. Emanuel consulted by telephone with MATHEW Beal.
 
1904; MATHEW Beal arrived to room. ICU RN, CHINMAY Davis is at the bedside as
well as RT who runs a stat EKG. Daytime House Supervisor CHINMAY Trammell is in the
room with PM House Supervisor CHINMAY Grimm.
 
1910; Pt's hearrate is sustained 140's-160's, EKG notes Aflutter, Tele-tech
says it appears to be Afib rvr. Dr. Emanuel on the telephone with MATHEW Beal who is at bedside at this time. CHINMAY Reno has brought in the Crash cart,
and pads are placed on the patient. The patient is very restless and agitated.
 
1915; Labs are drawn, ABG's are drawn, IV site procured by CHINMAY Davis in the
left antecubital.
 
1920; MATHEW Beal orders 2mg Morphine, and 0.5mg Ativan that is already on
the MAR. CHINMAY Jackson pulls meds and brings them to bedside.
 
1929; Ativan and Morphine given, pt HR is sustained in the 140's. Lian CARMONA orders Cardizem bolus.
 
1932; 10mg bolus of Cardizem administered. 
 
1934; Pt's heart rate remains 145. MATHEW Beal orders another 5mg Cardizem
bolus.
 
1947; Blood pressure check 149/111,  Dr Orta arrives at bedside.
 
1950; Pt's heart rate is still sustained at 145. MATHEW Beal orders another
15mg bolus of Cardizem.
 
1951; 15mg Cardizem administered.
 
2001; Blood pressure check 134/82 HR sustained 140s. MATHEW Beal is on the
phone with Mayte (Pts daughter who is the DPOA.) Mayte and Lydia (pt's
wife) decide that in the event his heart stops, we will not attempt to
resusitate.
 
2003; ICU transfer initiated Pt is now a DNR. Pt's BP is now 83/69 
 
2010; PT is in ICU rm 3. CHINMAY Vance is receiving RN. Report is given to
CHINMAY Vance by this RN. Transfer of care completed.
 
 
 pt's O2 was in the 70s. CHINMAY Jackson went to
get the patient an oxymask. Oxymask applied as CAT call was being made.

## 2021-07-31 NOTE — NUR
PT LAYING IN BED, REMAINS RESTLESS IN BED. SPEECH IS INCOMPREHENSIBLE. NO
OTHER CONCERNS AT THIS TIME.

## 2021-07-31 NOTE — NUR
Patient arrived to ICU at 2005. Assessment complete and charted. Wife at
bedside. Cardizem gtt infusing at 5mg/hr.
 
Dr. Orta arrived to bedside at 2020. [facility] supervisior working on
transfer to [facility].
 
2022 Per Dr. Orta given 20mg Lasix IV now. Patient BP 82/63. Hold lasix.
Hold cardizem.
 
2041 BP improved to 100/71. Per Dr. Orta give 250ml fluid bolus and leave
1000ml bag hanging.
 
2045: Per Dr. Orta give 10meq IV potassium now. Last potassium 3.3.
 
2100  EMS arrived for patient transfer to [facility]. Report given to EMS. Patient
left unit at 2116. Jose Manuel updated regarding potassium and patient leaving
time.

## 2021-07-31 NOTE — NUR
Daughter Mayte Stubbs on the phone would like to have her number added into
the system, phone number is; [phone].

## 2021-08-02 LAB
ALBUMIN/GLOB SERPL ELPH: 1.07 {RATIO}
B-GLOBULIN SERPL ELPH-MCNC: 0.8 G/DL (ref 0.7–1.2)
KAPPA LC FREE SER-MCNC: 38.99 MG/L
KAPPA LC/LAMBDA SER: 1.19 RATIO
LAMBDA LC FREE SERPL-MCNC: 32.75 MG/L